# Patient Record
Sex: MALE | Race: WHITE | NOT HISPANIC OR LATINO | URBAN - METROPOLITAN AREA
[De-identification: names, ages, dates, MRNs, and addresses within clinical notes are randomized per-mention and may not be internally consistent; named-entity substitution may affect disease eponyms.]

---

## 2017-11-14 ENCOUNTER — RECORDS - HEALTHEAST (OUTPATIENT)
Dept: LAB | Facility: CLINIC | Age: 77
End: 2017-11-14

## 2017-11-14 LAB — PSA SERPL-MCNC: 0.6 NG/ML (ref 0–6.5)

## 2019-02-05 ENCOUNTER — RECORDS - HEALTHEAST (OUTPATIENT)
Dept: LAB | Facility: CLINIC | Age: 79
End: 2019-02-05

## 2019-02-05 LAB
ANION GAP SERPL CALCULATED.3IONS-SCNC: 7 MMOL/L (ref 5–18)
BUN SERPL-MCNC: 19 MG/DL (ref 8–28)
CALCIUM SERPL-MCNC: 8.7 MG/DL (ref 8.5–10.5)
CHLORIDE BLD-SCNC: 107 MMOL/L (ref 98–107)
CO2 SERPL-SCNC: 27 MMOL/L (ref 22–31)
CREAT SERPL-MCNC: 1.1 MG/DL (ref 0.7–1.3)
GFR SERPL CREATININE-BSD FRML MDRD: >60 ML/MIN/1.73M2
GLUCOSE BLD-MCNC: 85 MG/DL (ref 70–125)
POTASSIUM BLD-SCNC: 4.6 MMOL/L (ref 3.5–5)
PSA SERPL-MCNC: 0.5 NG/ML (ref 0–6.5)
SODIUM SERPL-SCNC: 141 MMOL/L (ref 136–145)

## 2020-01-02 ENCOUNTER — RECORDS - HEALTHEAST (OUTPATIENT)
Dept: LAB | Facility: CLINIC | Age: 80
End: 2020-01-02

## 2020-01-02 LAB
ANION GAP SERPL CALCULATED.3IONS-SCNC: 9 MMOL/L (ref 5–18)
BUN SERPL-MCNC: 16 MG/DL (ref 8–28)
CALCIUM SERPL-MCNC: 8.3 MG/DL (ref 8.5–10.5)
CHLORIDE BLD-SCNC: 108 MMOL/L (ref 98–107)
CO2 SERPL-SCNC: 25 MMOL/L (ref 22–31)
CREAT SERPL-MCNC: 0.95 MG/DL (ref 0.7–1.3)
FOLATE SERPL-MCNC: 16 NG/ML
GFR SERPL CREATININE-BSD FRML MDRD: >60 ML/MIN/1.73M2
GLUCOSE BLD-MCNC: 140 MG/DL (ref 70–125)
POTASSIUM BLD-SCNC: 4 MMOL/L (ref 3.5–5)
SODIUM SERPL-SCNC: 142 MMOL/L (ref 136–145)
TSH SERPL DL<=0.005 MIU/L-ACNC: 3.21 UIU/ML (ref 0.3–5)
VIT B12 SERPL-MCNC: 671 PG/ML (ref 213–816)

## 2020-01-14 ENCOUNTER — AMBULATORY - HEALTHEAST (OUTPATIENT)
Dept: NEUROSURGERY | Facility: CLINIC | Age: 80
End: 2020-01-14

## 2020-01-14 DIAGNOSIS — M54.2 NECK PAIN: ICD-10-CM

## 2020-01-21 ENCOUNTER — RECORDS - HEALTHEAST (OUTPATIENT)
Dept: ADMINISTRATIVE | Facility: OTHER | Age: 80
End: 2020-01-21

## 2020-01-22 ENCOUNTER — RECORDS - HEALTHEAST (OUTPATIENT)
Dept: RADIOLOGY | Facility: CLINIC | Age: 80
End: 2020-01-22

## 2020-01-28 ENCOUNTER — OFFICE VISIT - HEALTHEAST (OUTPATIENT)
Dept: NEUROSURGERY | Facility: CLINIC | Age: 80
End: 2020-01-28

## 2020-01-28 ENCOUNTER — RECORDS - HEALTHEAST (OUTPATIENT)
Dept: GENERAL RADIOLOGY | Facility: CLINIC | Age: 80
End: 2020-01-28

## 2020-01-28 DIAGNOSIS — M54.2 CERVICALGIA: ICD-10-CM

## 2020-01-28 DIAGNOSIS — G95.9 CERVICAL MYELOPATHY (H): ICD-10-CM

## 2020-01-28 RX ORDER — FINASTERIDE 5 MG/1
5 TABLET, FILM COATED ORAL DAILY
Status: SHIPPED | COMMUNITY
Start: 2019-11-28

## 2020-01-28 RX ORDER — ASCORBIC ACID 500 MG
500 TABLET ORAL DAILY
Status: SHIPPED | COMMUNITY
Start: 2020-01-28

## 2020-01-28 RX ORDER — SODIUM FLUORIDE 6 MG/ML
PASTE, DENTIFRICE DENTAL
Status: SHIPPED | COMMUNITY
Start: 2019-08-06

## 2020-01-28 RX ORDER — TERAZOSIN 10 MG/1
10 CAPSULE ORAL AT BEDTIME
Status: SHIPPED | COMMUNITY
Start: 2019-10-31

## 2020-01-28 ASSESSMENT — MIFFLIN-ST. JEOR: SCORE: 1697.83

## 2020-02-07 ENCOUNTER — AMBULATORY - HEALTHEAST (OUTPATIENT)
Dept: NEUROSURGERY | Facility: CLINIC | Age: 80
End: 2020-02-07

## 2020-02-07 DIAGNOSIS — Z01.818 PRE-OP EXAM: ICD-10-CM

## 2020-02-10 ENCOUNTER — AMBULATORY - HEALTHEAST (OUTPATIENT)
Dept: LAB | Facility: HOSPITAL | Age: 80
End: 2020-02-10

## 2020-02-10 ENCOUNTER — AMBULATORY - HEALTHEAST (OUTPATIENT)
Dept: NEUROSURGERY | Facility: CLINIC | Age: 80
End: 2020-02-10

## 2020-02-10 ENCOUNTER — COMMUNICATION - HEALTHEAST (OUTPATIENT)
Dept: NEUROSURGERY | Facility: CLINIC | Age: 80
End: 2020-02-10

## 2020-02-10 ENCOUNTER — RECORDS - HEALTHEAST (OUTPATIENT)
Dept: LAB | Facility: CLINIC | Age: 80
End: 2020-02-10

## 2020-02-10 ENCOUNTER — SURGERY - HEALTHEAST (OUTPATIENT)
Dept: NEUROSURGERY | Facility: CLINIC | Age: 80
End: 2020-02-10

## 2020-02-10 DIAGNOSIS — M48.02 CERVICAL STENOSIS OF SPINAL CANAL: ICD-10-CM

## 2020-02-10 DIAGNOSIS — G95.20 CORD COMPRESSION (H): ICD-10-CM

## 2020-02-10 DIAGNOSIS — Z01.818 PRE-OP EXAM: ICD-10-CM

## 2020-02-10 DIAGNOSIS — M47.12 CERVICAL SPONDYLOSIS WITH MYELOPATHY: ICD-10-CM

## 2020-02-10 LAB
ANION GAP SERPL CALCULATED.3IONS-SCNC: 8 MMOL/L (ref 5–18)
APTT PPP: 30 SECONDS (ref 24–37)
BUN SERPL-MCNC: 20 MG/DL (ref 8–28)
CALCIUM SERPL-MCNC: 9 MG/DL (ref 8.5–10.5)
CHLORIDE BLD-SCNC: 106 MMOL/L (ref 98–107)
CLOSURE TME COLL+EPINEP BLD: 99 SEC (ref 1–180)
CO2 SERPL-SCNC: 27 MMOL/L (ref 22–31)
CREAT SERPL-MCNC: 1.07 MG/DL (ref 0.7–1.3)
GFR SERPL CREATININE-BSD FRML MDRD: >60 ML/MIN/1.73M2
GLUCOSE BLD-MCNC: 90 MG/DL (ref 70–125)
INR PPP: 1.08 (ref 0.9–1.1)
POTASSIUM BLD-SCNC: 4.4 MMOL/L (ref 3.5–5)
SODIUM SERPL-SCNC: 141 MMOL/L (ref 136–145)

## 2020-02-11 ENCOUNTER — ANESTHESIA - HEALTHEAST (OUTPATIENT)
Dept: SURGERY | Facility: CLINIC | Age: 80
End: 2020-02-11

## 2020-02-11 LAB — BACTERIA SPEC CULT: NO GROWTH

## 2020-02-11 ASSESSMENT — MIFFLIN-ST. JEOR: SCORE: 1738.65

## 2020-02-12 ENCOUNTER — RECORDS - HEALTHEAST (OUTPATIENT)
Dept: ADMINISTRATIVE | Facility: OTHER | Age: 80
End: 2020-02-12

## 2020-02-12 ENCOUNTER — SURGERY - HEALTHEAST (OUTPATIENT)
Dept: SURGERY | Facility: CLINIC | Age: 80
End: 2020-02-12

## 2020-02-12 ASSESSMENT — MIFFLIN-ST. JEOR
SCORE: 1682.23
SCORE: 1682.23

## 2020-02-14 ENCOUNTER — COMMUNICATION - HEALTHEAST (OUTPATIENT)
Dept: NEUROSURGERY | Facility: CLINIC | Age: 80
End: 2020-02-14

## 2020-02-14 DIAGNOSIS — M48.02 CERVICAL STENOSIS OF SPINAL CANAL: ICD-10-CM

## 2020-02-26 ENCOUNTER — AMBULATORY - HEALTHEAST (OUTPATIENT)
Dept: NEUROSURGERY | Facility: CLINIC | Age: 80
End: 2020-02-26

## 2020-03-25 ENCOUNTER — COMMUNICATION - HEALTHEAST (OUTPATIENT)
Dept: NEUROSURGERY | Facility: CLINIC | Age: 80
End: 2020-03-25

## 2020-05-14 ENCOUNTER — HOSPITAL ENCOUNTER (OUTPATIENT)
Dept: RADIOLOGY | Facility: HOSPITAL | Age: 80
Discharge: HOME | End: 2020-05-14
Attending: SURGERY

## 2020-05-14 ENCOUNTER — OFFICE VISIT - HEALTHEAST (OUTPATIENT)
Dept: NEUROSURGERY | Facility: CLINIC | Age: 80
End: 2020-05-14

## 2020-05-14 DIAGNOSIS — M48.02 CERVICAL STENOSIS OF SPINAL CANAL: ICD-10-CM

## 2020-05-14 ASSESSMENT — MIFFLIN-ST. JEOR: SCORE: 1679.68

## 2020-06-03 ENCOUNTER — RECORDS - HEALTHEAST (OUTPATIENT)
Dept: ADMINISTRATIVE | Facility: OTHER | Age: 80
End: 2020-06-03

## 2020-06-23 ENCOUNTER — RECORDS - HEALTHEAST (OUTPATIENT)
Dept: ADMINISTRATIVE | Facility: OTHER | Age: 80
End: 2020-06-23

## 2020-08-21 ENCOUNTER — OFFICE VISIT - HEALTHEAST (OUTPATIENT)
Dept: NEUROSURGERY | Facility: CLINIC | Age: 80
End: 2020-08-21

## 2020-08-21 ENCOUNTER — HOSPITAL ENCOUNTER (OUTPATIENT)
Dept: CT IMAGING | Facility: HOSPITAL | Age: 80
Discharge: HOME | End: 2020-08-21

## 2020-08-21 DIAGNOSIS — M47.12 CERVICAL SPONDYLOSIS WITH MYELOPATHY: ICD-10-CM

## 2020-08-21 DIAGNOSIS — M48.02 CERVICAL STENOSIS OF SPINAL CANAL: ICD-10-CM

## 2021-04-08 ENCOUNTER — RECORDS - HEALTHEAST (OUTPATIENT)
Dept: LAB | Facility: CLINIC | Age: 81
End: 2021-04-08

## 2021-04-08 LAB
ANION GAP SERPL CALCULATED.3IONS-SCNC: 10 MMOL/L (ref 5–18)
BUN SERPL-MCNC: 17 MG/DL (ref 8–28)
CALCIUM SERPL-MCNC: 8.5 MG/DL (ref 8.5–10.5)
CHLORIDE BLD-SCNC: 106 MMOL/L (ref 98–107)
CHOLEST SERPL-MCNC: 141 MG/DL
CO2 SERPL-SCNC: 25 MMOL/L (ref 22–31)
CREAT SERPL-MCNC: 1.07 MG/DL (ref 0.7–1.3)
FASTING STATUS PATIENT QL REPORTED: NORMAL
GFR SERPL CREATININE-BSD FRML MDRD: >60 ML/MIN/1.73M2
GLUCOSE BLD-MCNC: 95 MG/DL (ref 70–125)
HDLC SERPL-MCNC: 44 MG/DL
HGB BLD-MCNC: 13.5 G/DL (ref 14–18)
LDLC SERPL CALC-MCNC: 69 MG/DL
POTASSIUM BLD-SCNC: 4.4 MMOL/L (ref 3.5–5)
SODIUM SERPL-SCNC: 141 MMOL/L (ref 136–145)
TRIGL SERPL-MCNC: 138 MG/DL

## 2021-04-12 ENCOUNTER — RECORDS - HEALTHEAST (OUTPATIENT)
Dept: ADMINISTRATIVE | Facility: OTHER | Age: 81
End: 2021-04-12

## 2021-04-26 ENCOUNTER — HOSPITAL ENCOUNTER (OUTPATIENT)
Dept: CARDIOLOGY | Facility: HOSPITAL | Age: 81
Discharge: HOME | End: 2021-04-26
Attending: FAMILY MEDICINE

## 2021-04-26 DIAGNOSIS — R06.09 DYSPNEA ON EFFORT: ICD-10-CM

## 2021-04-26 LAB
CV STRESS CURRENT BP HE: NORMAL
CV STRESS CURRENT HR HE: 106
CV STRESS CURRENT HR HE: 108
CV STRESS CURRENT HR HE: 110
CV STRESS CURRENT HR HE: 115
CV STRESS CURRENT HR HE: 120
CV STRESS CURRENT HR HE: 122
CV STRESS CURRENT HR HE: 128
CV STRESS CURRENT HR HE: 155
CV STRESS CURRENT HR HE: 155
CV STRESS CURRENT HR HE: 157
CV STRESS CURRENT HR HE: 160
CV STRESS CURRENT HR HE: 75
CV STRESS CURRENT HR HE: 76
CV STRESS CURRENT HR HE: 76
CV STRESS CURRENT HR HE: 77
CV STRESS CURRENT HR HE: 77
CV STRESS CURRENT HR HE: 78
CV STRESS CURRENT HR HE: 78
CV STRESS CURRENT HR HE: 79
CV STRESS CURRENT HR HE: 79
CV STRESS CURRENT HR HE: 82
CV STRESS CURRENT HR HE: 82
CV STRESS CURRENT HR HE: 96
CV STRESS CURRENT HR HE: 98
CV STRESS CURRENT HR HE: 98
CV STRESS DEVIATION TIME HE: NORMAL
CV STRESS ECHO PERCENT HR HE: NORMAL
CV STRESS EXERCISE STAGE HE: NORMAL
CV STRESS FINAL RESTING BP HE: NORMAL
CV STRESS FINAL RESTING HR HE: 79
CV STRESS MAX HR HE: 169
CV STRESS MAX TREADMILL GRADE HE: 12
CV STRESS MAX TREADMILL SPEED HE: 2.5
CV STRESS PEAK DIA BP HE: NORMAL
CV STRESS PEAK SYS BP HE: NORMAL
CV STRESS PHASE HE: NORMAL
CV STRESS PROTOCOL HE: NORMAL
CV STRESS RESTING PT POSITION HE: NORMAL
CV STRESS RESTING PT POSITION HE: NORMAL
CV STRESS ST DEVIATION AMOUNT HE: NORMAL
CV STRESS ST DEVIATION ELEVATION HE: NORMAL
CV STRESS ST EVELATION AMOUNT HE: NORMAL
CV STRESS TEST TYPE HE: NORMAL
CV STRESS TOTAL STAGE TIME MIN 1 HE: NORMAL
RATE PRESSURE PRODUCT: NORMAL
STRESS ECHO BASELINE DIASTOLIC HE: 88
STRESS ECHO BASELINE HR: 77
STRESS ECHO BASELINE SYSTOLIC BP: 156
STRESS ECHO CALCULATED PERCENT HR: 121 %
STRESS ECHO LAST STRESS DIASTOLIC BP: 78
STRESS ECHO LAST STRESS HR: 160
STRESS ECHO LAST STRESS SYSTOLIC BP: 160
STRESS ECHO POST ESTIMATED WORKLOAD: 5.8
STRESS ECHO POST EXERCISE DUR MIN: 4
STRESS ECHO POST EXERCISE DUR SEC: 6
STRESS ECHO TARGET HR: 140

## 2021-05-26 VITALS — RESPIRATION RATE: 18 BRPM | HEART RATE: 70 BPM | SYSTOLIC BLOOD PRESSURE: 112 MMHG | DIASTOLIC BLOOD PRESSURE: 68 MMHG

## 2021-05-27 VITALS — SYSTOLIC BLOOD PRESSURE: 128 MMHG | DIASTOLIC BLOOD PRESSURE: 78 MMHG | HEART RATE: 72 BPM | RESPIRATION RATE: 18 BRPM

## 2021-06-01 ENCOUNTER — RECORDS - HEALTHEAST (OUTPATIENT)
Dept: ADMINISTRATIVE | Facility: CLINIC | Age: 81
End: 2021-06-01

## 2021-06-04 VITALS — HEIGHT: 73 IN | WEIGHT: 203.56 LBS | BODY MASS INDEX: 26.98 KG/M2

## 2021-06-04 VITALS
HEIGHT: 73 IN | DIASTOLIC BLOOD PRESSURE: 78 MMHG | SYSTOLIC BLOOD PRESSURE: 150 MMHG | HEART RATE: 69 BPM | OXYGEN SATURATION: 97 % | WEIGHT: 207 LBS | BODY MASS INDEX: 27.43 KG/M2

## 2021-06-04 VITALS
HEIGHT: 73 IN | BODY MASS INDEX: 26.86 KG/M2 | TEMPERATURE: 98.1 F | DIASTOLIC BLOOD PRESSURE: 69 MMHG | HEART RATE: 66 BPM | SYSTOLIC BLOOD PRESSURE: 118 MMHG | OXYGEN SATURATION: 95 %

## 2021-06-04 VITALS — WEIGHT: 203 LBS | HEIGHT: 73 IN | BODY MASS INDEX: 26.9 KG/M2

## 2021-06-05 NOTE — PROGRESS NOTES
NEUROSURGERY CONSULTATION NOTE    1/28/2020     Danny Yoder is a 79 y.o. male who is sent to us in consultation by Zac Hudson evaluation of cervical stenosis.         CONSULTATION ASSESSMENT AND PLAN:  78 yo male presents with symptomatic cervical myelopathy. MRI shows both ventral and dorsal stenosis at C3-4 resulting in severe cord impingement with possible cord signal change. He has spondylolisthesis at this level as well with 2 mm movement between flex/ex.  Discussed risks and benefits of a posterior cervical 3-cervical 4 b/l laminectomies and instrumented fusion. They agreed to proceed.     I spent more than 60 minutes in this apt, examining the pt, reviewing the scans, reviewing notes from chart, discussing treatment options with risks and benefits and coordinating care. >50 % clinic time was spent in face to face counseling and coordinating care    Kay Judd     HPI: 78 yo male presents with 5 weeks of imbalance and fine motor tasks difficultly. First had a sharp pain in his posterior neck at the end of November. Since that time has noted imbalance and difficulties with fine motor tasks. Left arm numnbess into 1st and second digit and numbness in distal fingertips on right arm only. No radicular arm pain. Urinary leakage. No bowel dysfunction. No prior falls. No recent falls.     Prior Spine Surgeryno    Past Medical History:   Diagnosis Date     Hypertension      History reviewed. No pertinent surgical history.    REVIEW OF SYSTEMS:  ROS reviewed with pt as documented on pt health form of 1/28/2020.        MEDICATIONS:  Current Outpatient Medications   Medication Sig Dispense Refill     ascorbic acid, vitamin C, (ASCORBIC ACID WITH SAQIB HIPS) 500 MG tablet Take 500 mg by mouth daily.       FA/mv,Ca,iron,min/lycopene/lut (MULTIVITAL ORAL) Take by mouth daily.       finasteride (PROSCAR) 5 mg tablet Take 5 mg by mouth daily.       FLUTICASONE PROPIONATE NASL into each nostril daily.        GARLIC ORAL Take by mouth daily.       glucosamine sulfate (GLUCOSAMINE) 500 mg Tab Take 750 mg by mouth daily.       naproxen sodium (ALEVE) 220 MG tablet Take 220 mg by mouth daily.       PREVIDENT 5000 BOOSTER PLUS 1.1 % Pste BRUSH WITH PASTE AT BEDTIME. DO NOT EAT OR DRINK FOR AT LEAST 30 MINUTES AFTER USE. USE REGULAR TOOTHPASTE IN THE MORNING AND MID-DAY       psyllium with dextrose (METAMUCIL JAR) powder Take 3.4 g by mouth daily.       terazosin (HYTRIN) 10 MG capsule Take 10 mg by mouth daily.       No current facility-administered medications for this visit.          ALLERGIES/SENSITIVITIES:     No Known Allergies    PERTINENT SOCIAL HISTORY:   Social History     Socioeconomic History     Marital status:      Spouse name: None     Number of children: None     Years of education: None     Highest education level: None   Occupational History     None   Social Needs     Financial resource strain: None     Food insecurity:     Worry: None     Inability: None     Transportation needs:     Medical: None     Non-medical: None   Tobacco Use     Smoking status: Former Smoker     Years: 25.00     Smokeless tobacco: Never Used   Substance and Sexual Activity     Alcohol use: Not Currently     Drug use: Never     Sexual activity: Yes     Partners: Female   Lifestyle     Physical activity:     Days per week: None     Minutes per session: None     Stress: None   Relationships     Social connections:     Talks on phone: None     Gets together: None     Attends Muslim service: None     Active member of club or organization: None     Attends meetings of clubs or organizations: None     Relationship status: None     Intimate partner violence:     Fear of current or ex partner: None     Emotionally abused: None     Physically abused: None     Forced sexual activity: None   Other Topics Concern     None   Social History Narrative     None         FAMILY HISTORY:  History reviewed. No pertinent family history.  "    PHYSICAL EXAM:   Constitutional: /78   Pulse 69   Ht 6' 1\" (1.854 m)   Wt 207 lb (93.9 kg)   SpO2 97%   BMI 27.31 kg/m       Mental Status: A & O in no acute distress.  Affect is appropriate.  Speech is fluent.  Recent and remote memory are intact.  Attention span and concentration are normal.     Cranial Nerves: CN1: grossly intact per patient recall. CN2: No funduscopic exam performed. CN3,4 & 6: Pupillary light response, lateral and vertical gaze normal.  No nystagmus.  Visual fields are full to confrontation. CN5: Intact to touch CN7: No facial weakness, smile, facial symmetry intact. CN8: Intact to spoken voice. CN9&10: Gag reflex, uvula midline, palate rises with phonation. CN11: Shoulder shrug 5/5 intact bilaterally. CN12: Tongue midline and moves freely from side to side.     Motor: No pronator drift of upper extremity. Normal bulk and tone all muscle groups of upper and lower extremities.      Sensory: Sensation intact bilaterally to light touch diminished over left 1st and 2nd digit only    Coordination:  Heel/toe/  gait intact. Imbalance with tandem gait      Reflexes; supinator, biceps, triceps, knee/ ankle jerk intact- slightly brisk throughout. no hoffmans/ no  babinski/ clonus.    IMAGING: I personally reviewed all radiographic images      Cc:   Zac Hudson MD  [address] [facility] [address]  "

## 2021-06-05 NOTE — PATIENT INSTRUCTIONS - HE
C3-C4 PCDF  Provided complete information about approaching surgery.  Discussed discharge planning and expected outcomes after surgery as well as follow-ups and restrictions.  Emphasized on stop taking ASA, NSAID's, vitamins and /or OTC herbal supplements within 10 days and any anticoagulant meds within 7 days prior to surgery.  Reminded patient to bring all pertinent films to hospital the day of surgery.    NPO after midnight, Please arrive 2.5 hours prior to scheduled surgery.    Using a washcloth and a bottle of Hibiclens, wash your body, avoiding your face and genitals. Preferably, shower the night before surgery and the morning of surgery using a half a bottle each time for your whole body shower. If you are unable to take a shower in the morning of surgery, please discuss your options with the nurse at your readiness visit.     Provided written pamphlets about surgery.  Answered all questions.  The  will call patient with all pre-op orders and instructions.  Patient to complete all required diagnostic tests prior to surgery.  If test are not completed this will cancel the surgery; contact the clinic nurses at [phone] if unable to complete test.

## 2021-06-05 NOTE — PROGRESS NOTES
Patient is here with wife for a consult on his Left arm pain that comes from his neck and goes to his hand. He reports he has numbness and tingling in both hands. Balance issues  And his fine motor skills are being affected.  Irma Kruse, PING, 2:23 PM

## 2021-06-06 NOTE — PATIENT INSTRUCTIONS - HE
WOUND CARE:  A dressing is not required after today. Ice to right side incision swelling.    Keep the wound clean. Ice may be used to relieve itching while the incision is still healing. Do not itch it.    Wash your hands before touching the wound.  Ensure that anyone assisting you in the care of your wound washes her/his hands before touching the wound. Good handwashing can decrease the risk of serious infection.  You may shower without covering incision. Pat the wound dry. Do not rub or scrub incision area.  Do not submerge wound under water. No baths or swimming for 6 weeks.    If you notice redness, swelling, temp 101 and above, or increased or persistent drainage from your wound, contact our office at [phone].    RESTRICTIONS:  *No lifting, pushing or pulling greater than 5-10 pound (this is about a gallon of milk) for the first 6 weeks after surgery .  *No repetitive bending, twisting, or jarring activities for 6 weeks.  *No overhead work  *No aerobic or strenuous activity  *No activities with increased risk of falls  *You may move about your home as tolerated  *You may walk up and down stairs as tolerated  *You may increase your activity slowly over the next 6 weeks    WALKING PROGRAM: As you can tolerate, walk daily-start with 5-10 minutes of continuous walking. This is in addition to the walking that you do as part of your daily activities. Increase the time that you walk by 5 minutes every couple of days. Do not exceed 30-45 minutes of continuous walking until seen in follow-up. Walking is the best exercise after surgery.  **Listen to your body, if you find that you are more painful or fatigued, you may need to proceed more slowly.    **Do not smoke or expose yourself to second hand smoke. Cigarette smoke can delay healing and cause complications.   DRIVING:  We recommend that you do not drive while taking medications for pain or muscle spasms. Always read and follow the advice on your prescription  bottle. If you have questions, speak with your pharmacist.  We recommend that you do not drive while wearing a brace, as it could limit your range of motion.  WORK: If you plan to return to work before you 4-6 weeks appointment, call and discuss with one of the nurses in the neurosurgery office.     QUESTIONS OR CONCERNS:   OUR OFFICE HOURS ARE FROM 9:00 A.M -4:30 P.M. MONDAY-FRIDAY.  AFTER OFFICE HOURS, CALLS ARE ANSWERED BY THE ON-CALL PROVIDER. PLEASE CALL WITH ROUTINE QUESTIONS DURING OFFICE HOURS.   THE ON-CALL PROVIDER WILL NOT REFILL PRESCRIPTIONS. PLEASE ALLOW 24-48 HOURS FOR REFILLS.     Post op evaluations will be with the nurse practitioner. We make every attempt to schedule appointments on a day when your surgeon is in clinic.

## 2021-06-06 NOTE — ANESTHESIA POSTPROCEDURE EVALUATION
Patient: Danny Yoder  Procedure(s):  CERVICAL 3-CERVICAL 4 POSTERIOR CERVICAL BILATERAL LAMINECTOMIES AND INSTRUMENTED FUSION, USE OF STEALTH NAVIGATION, USE OF ALLOGRAFT, USE OF AUTOGRAFT  Anesthesia type: general    Patient location: PACU  Last vitals:   Vitals Value Taken Time   /82 2/12/2020  5:15 PM   Temp 36.4  C (97.6  F) 2/12/2020  4:15 PM   Pulse 52 2/12/2020  5:15 PM   Resp 15 2/12/2020  5:15 PM   SpO2 99 % 2/12/2020  5:15 PM     Post vital signs: stable  Level of consciousness: awake and responds to simple questions  Post-anesthesia pain: pain controlled  Post-anesthesia nausea and vomiting: no  Pulmonary: unassisted, nasal cannula  Cardiovascular: stable and blood pressure at baseline  Hydration: adequate  Anesthetic events: no    QCDR Measures:  ASA# 11 - Radha-op Cardiac Arrest: ASA11B - Patient did NOT experience unanticipated cardiac arrest  ASA# 12 - Radha-op Mortality Rate: ASA12B - Patient did NOT die  ASA# 13 - PACU Re-Intubation Rate: ASA13B - Patient did NOT require a new airway mgmt  ASA# 10 - Composite Anes Safety: ASA10A - No serious adverse event    Additional Notes:

## 2021-06-06 NOTE — ANESTHESIA CARE TRANSFER NOTE
Patient remains intubated and ventilated to PACU via ambu bag at 15L O2.  In PACU connected to ventilator by RT. Vital signs stable, SBAR report to RN per institutional policy and procedure.  Transfer of care.      Last vitals:   Vitals:    02/12/20 1325   BP: 138/65   Pulse: (!) 52   Resp: 16   Temp: 36.6  C (97.8  F)   SpO2: 99%     Patient's level of consciousness is unresponsive  Spontaneous respirations: no: vent  Maintains airway independently: no: ett  Dentition unchanged: yes  Oropharynx: oral airway in place and endotracheal tube in place    QCDR Measures:  ASA# 20 - Surgical Safety Checklist: WHO surgical safety checklist completed prior to induction    PQRS# 430 - Adult PONV Prevention: 4558F - Pt received => 2 anti-emetic agents (different classes) preop & intraop  ASA# 8 - Peds PONV Prevention: NA - Not pediatric patient, not GA or 2 or more risk factors NOT present  PQRS# 424 - Radha-op Temp Management: 4559F - At least one body temp DOCUMENTED => 35.5C or 95.9F within required timeframe  PQRS# 426 - PACU Transfer Protocol: - Transfer of care checklist used  ASA# 14 - Acute Post-op Pain: ASA14B - Patient did NOT experience pain >= 7 out of 10

## 2021-06-06 NOTE — TELEPHONE ENCOUNTER
ORDER FROM: Dr. Judd    PRE AUTHORIZATION: PA approved. Ok to schedule.    METHOD OF PATIENT CONTACT: Spoke with Asaf on the phone. Best number to reach: [phone]    PROCEDURE: Cervical 3-cervical 4 posterior cervical bilateral laminectomies and instrumented fusion, use of stealth navigation, use of allograft, use of autograft    SURGICAL DATE: 2/12/2020 @ 9:00 AM     READINESS VISIT: 2/10/2020 @ 1:30 PM ([place] [facility])    PCP, CLINIC, PHONE #: Dr. Hudson, [facility], [phone]    PRE-OP PHYSICAL: 2/10/2020 @ 10:30 AM with Dr. Rivera    FILM INFO: XRAY CERVICAL: 01/28/2020 @ [facility]          MRI CERVICAL: 1/10/2020 @ [facility] (loaded to NIL)    SURGICAL LETTER: Given to patient at Readiness visit.

## 2021-06-06 NOTE — PATIENT INSTRUCTIONS - HE
To OR as planned.     Please arrive 2.5 hours prior to scheduled surgery.    Nothing to eat or drink after midnight the night before surgery.     Bring all pertinent films to hospital the day of surgery.     Continue to refrain from NSAIDS (Ibuprofen, Aleve, Naprosyn), ASA, Over the counter herbal medications or supplements, anti-coagulants and blood thinners.     Using a washcloth and a bottle of provided Hibiclens, wash your body, avoiding your face and genitals. Preferably, shower the night before surgery and the morning of surgery using a half a bottle each time for your whole body shower.

## 2021-06-06 NOTE — ANESTHESIA PREPROCEDURE EVALUATION
Anesthesia Evaluation      Patient summary reviewed   No history of anesthetic complications     Airway   Mallampati: II  Neck ROM: limited   Pulmonary - negative ROS and normal exam                          Cardiovascular - normal exam  Exercise tolerance: > or = 4 METS  (+) hypertension, , hypercholesterolemia,     ECG reviewed        Neuro/Psych    (+) neuromuscular disease,      Endo/Other - negative ROS      GI/Hepatic/Renal - negative ROS           Dental - normal exam                        Anesthesia Plan  Planned anesthetic: general endotracheal  50 mg ketamine IV on induction.  4 grams magnesium IV.  ASA 2   Induction: intravenous   Anesthetic plan and risks discussed with: patient, spouse and child/children  Anesthesia plan special considerations: video-assisted, arterial catheterization, IV therapy two IVs, dexmedetomidine  Post-op plan: routine recovery

## 2021-06-06 NOTE — ANESTHESIA PROCEDURE NOTES
Arterial Line    Patient location during procedure: Pre-op  Start time: 2/12/2020 7:15 AM  End time: 2/12/2020 7:22 AM  Staffing:  Performing  Anesthesiologist: Zac Katz MD  Sterile Precautions:  sterile barriers used during insertion: cap, mask, sterile gloves, large sheet, and hand hygiene used.  Arterial Line:   Immediately prior to procedure a time out was called to verify the correct patient, procedure, equipment, support staff and site/side marked as required  Laterality: left  Location: radial  Prepped with: ChloroPrep    Needle gauge: 20 G  Number of Attempts: 1  Secured with: tape and transparent dressing  Flushed with: saline  1% lidocaine local anesthesia used for skin prep.   See MAR for additional medications given.

## 2021-06-06 NOTE — PROGRESS NOTES
Danny Yoder, 1940 is status post CERVICAL 3-CERVICAL 4 POSTERIOR CERVICAL BILATERAL LAMINECTOMIES AND INSTRUMENTED FUSION, USE OF STEALTH NAVIGATION, USE OF ALLOGRAFT, USE OF AUTOGRAFT by Dr. Judd on 2/12/20.    Today, Danny Yoder reports still having numbness in the fingers. Pain is mild. Pt. reports that the symptoms are improved from pre-op.    Surgical wound WNL - [facility], no signs of infection or skin breakdown.  Incision well-healed: good skin approximation, no redness or visible/palpable edema, no tenderness to palpation. Small area of swelling to the right of incision on the superior end of incision.  PT. AF, denies fever, chills or sweats.  Pt. reports that the symptoms are improved from pre-op.    Staples-intact removed without difficulty. Wound prepped with Betadine before and after removal.  Surrounding skin has no signs of breakdown.  Verbal instructions regarding incision care are given.  Pt. advised to call us if any s/s of infection noted - all discussed in details.      Irma Kruse, PING, 1:03 PM

## 2021-06-06 NOTE — PROGRESS NOTES
Preop Assessment: Danny Yoder presents for pre-op review.  Surgeon: Dr. Judd  Name of Surgery: Cervical C3- C4 posterior fusion  Diagnosis: cervical radiculopathy  Date of Surgery: 2/12/2020  Time of Surgery: 0900  Hospital: [facility]  H&P: by Dr. Rivera on 2/10/2020 - cleared for surgery  History of ASA, NSAIDS, vitamin and/or herbal supplements within 10 days: No  History of blood thinners: No  History of anti-seizure med's: No  Review of systems: unchanged    Diagnostics:  Labs: WNL (repeat nicotine test on 2/19/2020)  CXR: n/a  EKG: n/a  Other: Holt J collar to OR  Films: 1/10/2020 - in NIL      Last BM - 2/9/2020  Nausea or Vomiting - denies  Urinary retention - denies  Pain management - no Red Flags  Home PT Evaluation: ambulates independently, steady gait and stride, no imbalance noted  - no indication for Home PT pre-op  Patient confirmed they have help/assistance in place at home upon discharge      All questions answered regarding surgery and expected pre and postoperative course including rehabilitation phase.     Reviewed with patient: Arrive 2.5 -3 hours prior to scheduled surgery, nothing to eat or drink after midnight the night before surgery and bring all pertinent films to the hospital the day of surgery.  Continue to refrain from NSAIDS (Ibuprofen, Aleve, Naprosyn) ASA or over the counter herbal medication or supplements, anticoagulants and blood thinners.    Preop skin preparations and instructions provided.    Incentive Spirometer usage instructions provided.    Patient confirmed they have help/assistance in place at home upon discharge.    Patient was informed that we will provide up to 1 week prescription of pain medication for post-operative pain.    Surgical consent was signed.      Discussed with patient the following: after your surgery, if you will be staying in-patient, a nursing team will be monitoring you closely throughout your stay and communicate your health status to your  surgeon and other providers.  You will be seen by Advanced Practice Providers (e.g., nurse practitioners, clinic nurse specialist, and physician assistants) who will check on you regularly to assess the status of your surgery.         Alison Parish RN, CNRN

## 2021-06-08 NOTE — PROGRESS NOTES
"Danny Yoder is a 80 y.o. male who is being evaluated via a billable telephone visit.      The patient has been notified of following:     \"This telephone visit will be conducted via a call between you and your physician/provider. We have found that certain health care needs can be provided without the need for a physical exam.  This service lets us provide the care you need with a short phone conversation.  If a prescription is necessary we can send it directly to your pharmacy.  If lab work is needed we can place an order for that and you can then stop by our lab to have the test done at a later time.    Telephone visits are billed at different rates depending on your insurance coverage. During this emergency period, for some insurers they may be billed the same as an in-person visit.  Please reach out to your insurance provider with any questions.    If during the course of the call the physician/provider feels a telephone visit is not appropriate, you will not be charged for this service.\"    Patient has given verbal consent to a Telephone visit? Yes        Reason for visit:  12wks postop s/p CERVICAL 3-CERVICAL 4 POSTERIOR CERVICAL BILATERAL LAMINECTOMIES AND INSTRUMENTED FUSION, USE OF STEALTH NAVIGATION, USE OF ALLOGRAFT, USE OF AUTOGRAFT  by Dr Judd on 2/12/2020    Preop c/o progressive imbalance and fine motor difficulty. Had sharp neck pain end of November. Left arm numbness first and second digits on left and numbness all five fingers on the right. Endorsed urinary leakage. MRI with C3-4 ventral and dorsal stenosis with cord impingement. Spondylolisthesis with 2 mm movement between flex/ex.      Subjective:  Pt reports muscular discomfort in neck after 30mins of walking, otherwise no pain. Stable 70% strength in left arm. Ongoing numbness in left arm, 1st and 2nd digits on left hand, and all 5 fingers on right. He reports it has felt more constant rather than intermittent prior to surgery but has " not been getting worse since surgery. He has not been wearing his collar much-- going without his collar most of the time, but sometimes wears it during activity.     Denies radicular arm pain, leg symptoms, or difficulty with b/b control. He is not taking any pain meds.    Imaging:  Cervical XR done today were personally reviewed - no rad report available at time of visit or by end of day-  Hardware appears intact and in appropriate position, no evidence of loosening  No change in alignment of vertebral bodies    Diagnosis:  Cervical stenosis    Assessment/Plan:  Ok to wean collar off the rest of the way   Start PT (pt requests external referral closer to home)  Tylenol for muscle soreness - continue to avoid NSAIDs if possible given intx fusion  Could add muscle relaxer however pt requests to defer this  Plan to follow up 6mos postop with cervical CT scan with AP.   He knows to call between now and then if he has any concerns, if neck pain becomes sharp, or worsening symptoms.    I have reviewed and updated the patient's Past Medical History, Social History, Family History and Medication List.  Patient's allergies were reviewed.        Phone call duration: 15 minutes      Reba HARTMAN  [facility]  O. [phone]

## 2021-06-10 NOTE — PROGRESS NOTES
NEUROSURGERY FOLLOW UP EVALUATION:  The patient's attending neurosurgeon is Dr Judd    ASSESSMENT:  6mo postop visit with new cervical CT s/p C3-4 posterior cervial laminectomies and instrumented fusion by Dr Judd    Reassured patient that his cspine CT shows stable hardware without complication. His ongoing symptoms likely multifactorial, muscle spasm vs nerve irritation vs chronic nerve damage/myelopathy. Patient does not want to pursue MRI or EMG at this time. He prefers to wait until appt at 1yr postop. Encouraged him to call us sooner if any symptoms worsen. We discussed the artifact on his scan - he denies any swallowing difficulty.    PLAN:  1. Continue PT exercises.  2. Consider follow up cervical MRI or EMG LUE at next appt if symptoms still bothersome. EMG in system is from intraop.  3. Plan to follow up with NP in 6 mos with new cervical CT scan.      HPI:  Danny Yoder is a 80 y.o. male, who presents today status post CERVICAL 3-CERVICAL 4 POSTERIOR CERVICAL BILATERAL LAMINECTOMIES AND INSTRUMENTED FUSION, USE OF STEALTH NAVIGATION, USE OF ALLOGRAFT, USE OF AUTOGRAFT by Dr Judd on 2/12/2020.    Today he reports overall improvement. Arms stronger, pain is less. Still has twinges of pain in the muscle near the lower aspect of his incision on the left side with activity. Some discomfort left trap and consistent numbness with exertion of left forearm and biceps. Numbness improves with rest. Stable numbness in R index finger. R arm otherwise feels good. He did go to PT postop and has continued to do the exercises they gave him. Denies radiating arm pain or leg symptoms. Incision is well-healed.  Pt expresses some frustration that provider entered room 15 min after appt time    EXAM:     Alert and oriented x3, speech is normal    Motor Strength:  Biceps 5/5 right, 5/5 left   Wrist extensors 5/5 right, 5/5 left   Triceps 5/5 right, 5/5 left   Deltoids 5/5 right, 5/5 left  Finger flexion 5/5 right,  5/5 left   Finger abduction 5/5 right, 5/5 left   Hand Grasp 4+/5 right, 4+/5 left  Hip flexors 5/5 right, 5/5 left   Quadriceps: 5/5 right, 5/5 left   Ankle dorsiflexion: 5/5 right, 5/5 left   Ankle plantar flexion : 5/5 right, 5/5 left     Bulk and tone: normal    Gait normal  Incision well-healed - no reproducible tenderness of nearby muscle     IMAGING:  The imaging was personally reviewed.  EXAM: CT CERVICAL SPINE WO CONTRAST  LOCATION: [facility]  DATE/TIME: 8/21/2020 11:58 AM     INDICATION: 6mos postop cervical fusion, stat read as pt needs appt with NP afterwards  COMPARISON: Plain films 05/14/2020.  TECHNIQUE: Routine without IV contrast. Multiplanar reformats. Dose reduction techniques were used.     FINDINGS:   VERTEBRA: Normal vertebral body heights. Straightened cervical lordosis unchanged with stable minimal anterior subluxation C6 on C7 and C7 on T1. Again seen are posterior instrumented fusion changes at the C3-C4 level. The hardware appears intact. The   right C3 fixation screw has a slightly medial course. Hardware otherwise unremarkable. Suggestion of ankylosis of the left C3-C4 facet. Lateral alignment across the fused levels satisfactory. Laminectomy changes also at this level with grossly   satisfactory central canal. No instrumentation loosening or failure. No pseudarthrosis.      CANAL/FORAMINA: Grossly no high-grade central spinal canal narrowing in the cervical region. Multilevel varying degrees of bony neural foraminal narrowing bilaterally.     EXTRASPINAL: No prevertebral soft tissue swelling or posterior fluid collection. There is a rounded subtle increased density within the left side of the esophagus on several cuts adjacent to the lumen. The nature of this is uncertain and could be   artifactual. Correlate for any swallowing issues.        IMPRESSION:   1.  No instrumentation loosening or failure.  2.  Grossly the central canal is adequate in the cervical region with  varying degrees of bony foraminal narrowing at several cervical levels noted.  3.  Rounded subtle increased density in the upper thoracic esophagus noted adjacent to the lumen. The nature of this is uncertain and correlate for any swallowing issues. It could be some type of unusual artifact.       Reba Sepulveda P-C  [facility]  O. [phone]

## 2021-06-16 PROBLEM — M48.02 CERVICAL STENOSIS OF SPINE: Status: ACTIVE | Noted: 2020-02-12

## 2021-06-16 PROBLEM — M47.12 CERVICAL SPONDYLOSIS WITH MYELOPATHY: Status: ACTIVE | Noted: 2020-02-10

## 2021-06-16 PROBLEM — G95.9 CERVICAL MYELOPATHY (H): Status: ACTIVE | Noted: 2020-02-12

## 2021-06-16 PROBLEM — M48.02 CERVICAL STENOSIS OF SPINAL CANAL: Status: ACTIVE | Noted: 2020-02-10

## 2021-06-20 NOTE — LETTER
Letter by Kay Judd MD at      Author: Kay Judd MD Service: -- Author Type: --    Filed:  Encounter Date: 2/10/2020 Status: (Other)       Dear Mr. Yoder,    This letter will help in preparation for your upcoming surgery. Please contact us with any additional questions you may have regarding your surgery. Contact information for your surgery scheduler:   Oscar Judd and Ana: [phone] ~ Trinity Paulino and Ming: [phone] ~ Taz    You are scheduled for: Cervical Laminectomies and Instrumented Fusion  With: Dr. Kay Judd  Date/Time: Wednesday, February 12, 2020 at 9:00 am (time subject to change)  Location: [place] [address]    Check in at the Welcome Desk inside the main doors of the [facility]. An escort will take you to the surgery waiting area. A nurse from [facility] (surgery admit unit) at the hospital will call you with your exact arrival time to the hospital - typically one-and-a-half to two-and-a-half hours prior to your scheduled surgery time.     In the event of an emergency surgery case, there may be an adjustment to your start time for surgery.     PREPARING FOR YOUR SURGERY    *Pre-op Physical: Done on Monday, February 10 with Dr. Malu Rivera at the [facility].      *Please discuss the necessity of receiving a pneumococcal vaccine prior to surgery at your pre-op physical. Recommended for all patients over the age of 65, or based on certain medical conditions.     *After the pre-op physical is complete, please have your clinic fax the visit note to your surgery scheduler at [phone].    *Pre-op Lab Work: Please have labs drawn at the [facility] located inside of the hospital.     *Collar/Brace: You will be fitted for the collar/brace at your Readiness Visit with our office. Please bring collar/brace with you the day of surgery.    *Readiness Visit:  Monday, February 10, 2020 at 1:30 pm at the [facility] at [address], [place] [address].    *Bring your images on disk or film to your Readiness Visit so we can have them loaded into our system and available for your surgery, if they have not previously been brought to our office. Failure to bring your images to our office will result in cancellation of your surgery.     *Ensure that you have completed your pre-op physical, along with any other necessary tests/appointments (listed above), prior to your Readiness Visit.       ADDITIONAL INFORMATION REGARDING YOUR SURGERY    Medications    Bring a list ALL of your medications, including any over-the-counter vitamins and herbal supplements to your Readiness Visit, and on the day of surgery.    DO NOT bring your medications with you the day of surgery.    Please see attached third sheet for more details on medications/vitamins/herbal supplements that should be discontinued prior to your surgery date.     If you are unsure if you should discontinue a medication/ vitamin/herbal supplement, please call our office and discuss with a nurse.    Continue taking your medications/vitamins/herbal supplements unless they are on the attached list.     Failure to follow the instructions regarding medications/vitamins/herbal supplements will result in cancellation of your surgery.    Day BEFORE Surgery    DO NOT shave near your surgical site. This can cause irritation of the skin    Using a washcloth and provided bottle of Hibiclens, shower the night BEFORE surgery, using a half bottle of Hibiclens to wash your body, avoiding face and genitals. The morning OF surgery, shower and use the second half of the bottle to wash your body, avoiding face and genitals. If you are unable to take a shower the morning of surgery, please discuss your options with the nurse at your readiness visit.     NOTHING  to eat after 11:00 p.m. the night prior to your  procedure    CLEAR LIQUIDS: May have the following liquids up to two (2) hours before your arrival time at the hospital: water, plain black coffee (no cream or milk), plain black tea or plain green tea (no cream or milk), Gatorade or Propel Water.    SMOKING: Stop smoking as far before surgery as possible, or as directed by your surgeon. NO tobacco products of any kind (cigarettes, e-cigarettes, chewing tobacco) beginning at 11:00 p.m. the night prior to your procedure.     ALCOHOL: You should stop drinking alcohol beginning at 11:00 p.m. the night prior to your procedure    Contact our office if you have symptoms of illness such as a fever of 101 or greater, chills, cough, sore throat, or if you develop a rash or any open sore    Day OF Surgery    If youve been instructed to take a medication(s) on the morning of surgery, please take with a very small sip of water.    Wear loose & comfortable clothing and flat shoes, Leave jewelry/valuables at home. If you wear contact lenses, remove them at home and wear glasses. Remove any body piercings. Remove nail polish.     Planning for Discharge    Start planning for your care after discharge as soon as you receive this letter.    If you have not made arrangements to have someone take you home and stay with you for the first 48 hours after discharge, your surgery will be cancelled.      PRE-OPERATIVE MEDICATION INSTRUCTIONS  Review this information with your primary care physician prior to discontinuing any of the medications listed below.  Notify your primary care physician that you have been instructed to discontinue these medications.    TEN (10) Days Prior to Surgery, STOP the Following Medications   Maria Ines-Proctor  Anacin  Aspirin  Excedrin  Pepto Bismol    **Before taking ANY over-the-counter medications, check the label for Aspirin   Non-steroidal   Anti-inflammatory Medications (NSAIDS)    Celebrex  Diclofenac (Cataflam)  Etodolac (Iodine)  Fenoprofen  (Nalfon)  Ibuprofen (Advil, Motrin, Nuprin)  Indomethacin (Indocin)  Ketoprofen  Ketorolac (Toradol)  Melaxicam (Mobic)  Naproxen (AnaProx, Aleve, Naprosyn)  Relafen (Nabumetone)   Herbal Supplements (this is a partial list of herbals to be discontinued)    Tim Fortune Quai  Ephedra  Feverfew  Fish Oil  Flaxseed Oil  Garlic  Shayla  Gingko  Ginseng  Goldenseal  Imitrex (Sumatriptan)  Kava  Krill Oil  Licorice  Multi Vitamins  [facility]  Valerian  Vitamin E  Yohimbe   CHECK WITH YOUR PRESCRIBING DOCTOR BEFORE STOPPING ANY BLOOD THINNERS (approximately 7 days prior to surgery)  (Coumadin, Plavix, Platel, Aggrenox, Effient (Prasugrel), Ticlid), Xarelto, and Pradaxa      ALWAYS CHECK WITH YOUR PRESCRIBING DOCTOR REGARDING THE MEDICATIONS LISTED BELOW; RECOMMENDED STOP TIME IS ALSO LISTED      If you are taking Lovenox, discontinue 24 HOURS prior to surgery    If you are taking weight loss medication, discontinue 7 days prior to surgery    If you are taking Metformin or Simvastatin, check with your primary care physician (or whoever has prescribed you this medication) regarding when to discontinue prior to surgery        and traditional parking is located at [facility] at [address] [place] [address]. Validation is done at the Welcome Desk in the [facility].

## 2021-07-04 ENCOUNTER — HEALTH MAINTENANCE LETTER (OUTPATIENT)
Age: 81
End: 2021-07-04

## 2021-10-24 ENCOUNTER — HEALTH MAINTENANCE LETTER (OUTPATIENT)
Age: 81
End: 2021-10-24

## 2022-07-31 ENCOUNTER — HEALTH MAINTENANCE LETTER (OUTPATIENT)
Age: 82
End: 2022-07-31

## 2022-10-07 ENCOUNTER — LAB REQUISITION (OUTPATIENT)
Dept: LAB | Facility: CLINIC | Age: 82
End: 2022-10-07

## 2022-10-07 DIAGNOSIS — I10 ESSENTIAL (PRIMARY) HYPERTENSION: ICD-10-CM

## 2022-10-07 LAB
ANION GAP SERPL CALCULATED.3IONS-SCNC: 10 MMOL/L (ref 7–15)
BUN SERPL-MCNC: 18.2 MG/DL (ref 8–23)
CALCIUM SERPL-MCNC: 8.9 MG/DL (ref 8.8–10.2)
CHLORIDE SERPL-SCNC: 104 MMOL/L (ref 98–107)
CREAT SERPL-MCNC: 1.13 MG/DL (ref 0.67–1.17)
DEPRECATED HCO3 PLAS-SCNC: 28 MMOL/L (ref 22–29)
GFR SERPL CREATININE-BSD FRML MDRD: 65 ML/MIN/1.73M2
GLUCOSE SERPL-MCNC: 95 MG/DL (ref 70–99)
POTASSIUM SERPL-SCNC: 4.7 MMOL/L (ref 3.4–5.3)
SODIUM SERPL-SCNC: 142 MMOL/L (ref 136–145)

## 2022-10-07 PROCEDURE — 80048 BASIC METABOLIC PNL TOTAL CA: CPT | Performed by: FAMILY MEDICINE

## 2022-10-15 ENCOUNTER — HEALTH MAINTENANCE LETTER (OUTPATIENT)
Age: 82
End: 2022-10-15

## 2023-05-23 NOTE — TELEPHONE ENCOUNTER
PATIENT NAME:  Danny Yoder  YOB: 1940  MRN: [id number]  SURGEON: Dr. Judd  DATE of SURGERY: 2-12-20  PROCEDURE: CERVICAL 3-CERVICAL 4 POSTERIOR CERVICAL BILATERAL LAMINECTOMIES AND INSTRUMENTED FUSION, USE OF STEALTH NAVIGATION, USE OF ALLOGRAFT, USE OF AUTOGRAFT    FOLLOW-UP: please call pt and reschedule post op appointments made prior to discharge. They prefer [facility] if possible    Staples Out : 14 Days [On nurse schedule unless noted otherwise]  Post Op Visit: 6+ weeks   Post-op Provider: NP  DIAGNOSTICS:  radiology: X-Ray: cervical, ap/lat upright in brace  (ordered 2-14-20)  DISPOSITION:  Home 2-14-20    ADDITIONAL INSTRUCTIONS FOR MEDICAL STAFF:    Cori BRAR when out of bed   Rhomboid Transposition Flap Text: The defect edges were debeveled with a #15 scalpel blade.  Given the location of the defect and the proximity to free margins a rhomboid transposition flap was deemed most appropriate.  Using a sterile surgical marker, an appropriate rhomboid flap was drawn incorporating the defect.    The area thus outlined was incised deep to adipose tissue with a #15 scalpel blade.  The skin margins were undermined to an appropriate distance in all directions utilizing iris scissors.

## 2023-08-20 ENCOUNTER — HEALTH MAINTENANCE LETTER (OUTPATIENT)
Age: 83
End: 2023-08-20

## 2023-10-10 ENCOUNTER — LAB REQUISITION (OUTPATIENT)
Dept: LAB | Facility: CLINIC | Age: 83
End: 2023-10-10

## 2023-10-10 DIAGNOSIS — I10 ESSENTIAL (PRIMARY) HYPERTENSION: ICD-10-CM

## 2023-10-10 PROCEDURE — 80048 BASIC METABOLIC PNL TOTAL CA: CPT | Performed by: FAMILY MEDICINE

## 2023-10-11 LAB
ANION GAP SERPL CALCULATED.3IONS-SCNC: 12 MMOL/L (ref 7–15)
BUN SERPL-MCNC: 16.2 MG/DL (ref 8–23)
CALCIUM SERPL-MCNC: 8.8 MG/DL (ref 8.8–10.2)
CHLORIDE SERPL-SCNC: 104 MMOL/L (ref 98–107)
CREAT SERPL-MCNC: 1.19 MG/DL (ref 0.67–1.17)
DEPRECATED HCO3 PLAS-SCNC: 24 MMOL/L (ref 22–29)
EGFRCR SERPLBLD CKD-EPI 2021: 61 ML/MIN/1.73M2
GLUCOSE SERPL-MCNC: 102 MG/DL (ref 70–99)
POTASSIUM SERPL-SCNC: 4.4 MMOL/L (ref 3.4–5.3)
SODIUM SERPL-SCNC: 140 MMOL/L (ref 135–145)

## 2024-10-17 ENCOUNTER — LAB REQUISITION (OUTPATIENT)
Dept: LAB | Facility: CLINIC | Age: 84
End: 2024-10-17

## 2024-10-17 DIAGNOSIS — I10 ESSENTIAL (PRIMARY) HYPERTENSION: ICD-10-CM

## 2024-10-17 LAB
ANION GAP SERPL CALCULATED.3IONS-SCNC: 9 MMOL/L (ref 7–15)
BUN SERPL-MCNC: 17.1 MG/DL (ref 8–23)
CALCIUM SERPL-MCNC: 9.1 MG/DL (ref 8.8–10.4)
CHLORIDE SERPL-SCNC: 104 MMOL/L (ref 98–107)
CREAT SERPL-MCNC: 1.12 MG/DL (ref 0.67–1.17)
EGFRCR SERPLBLD CKD-EPI 2021: 65 ML/MIN/1.73M2
GLUCOSE SERPL-MCNC: 103 MG/DL (ref 70–99)
HCO3 SERPL-SCNC: 27 MMOL/L (ref 22–29)
POTASSIUM SERPL-SCNC: 4.5 MMOL/L (ref 3.4–5.3)
SODIUM SERPL-SCNC: 140 MMOL/L (ref 135–145)

## 2024-10-17 PROCEDURE — 80048 BASIC METABOLIC PNL TOTAL CA: CPT | Performed by: FAMILY MEDICINE
